# Patient Record
Sex: MALE | Race: WHITE | NOT HISPANIC OR LATINO | Employment: FULL TIME | URBAN - NONMETROPOLITAN AREA
[De-identification: names, ages, dates, MRNs, and addresses within clinical notes are randomized per-mention and may not be internally consistent; named-entity substitution may affect disease eponyms.]

---

## 2024-08-16 ENCOUNTER — OFFICE VISIT (OUTPATIENT)
Dept: URGENT CARE | Facility: PHYSICIAN GROUP | Age: 39
End: 2024-08-16
Payer: COMMERCIAL

## 2024-08-16 VITALS
BODY MASS INDEX: 29.88 KG/M2 | TEMPERATURE: 98.1 F | RESPIRATION RATE: 16 BRPM | HEART RATE: 75 BPM | HEIGHT: 74 IN | OXYGEN SATURATION: 96 % | WEIGHT: 232.8 LBS | SYSTOLIC BLOOD PRESSURE: 112 MMHG | DIASTOLIC BLOOD PRESSURE: 70 MMHG

## 2024-08-16 DIAGNOSIS — M54.12 CERVICAL RADICULOPATHY: ICD-10-CM

## 2024-08-16 DIAGNOSIS — M62.838 MUSCLE SPASM: ICD-10-CM

## 2024-08-16 DIAGNOSIS — M47.812 CERVICAL SPONDYLOSIS: ICD-10-CM

## 2024-08-16 PROCEDURE — 99204 OFFICE O/P NEW MOD 45 MIN: CPT | Performed by: STUDENT IN AN ORGANIZED HEALTH CARE EDUCATION/TRAINING PROGRAM

## 2024-08-16 RX ORDER — PREDNISONE 20 MG/1
TABLET ORAL
Qty: 30 TABLET | Refills: 0 | Status: SHIPPED | OUTPATIENT
Start: 2024-08-16 | End: 2024-08-31

## 2024-08-16 ASSESSMENT — PAIN SCALES - GENERAL: PAINLEVEL: 3=SLIGHT PAIN

## 2024-08-16 NOTE — PROGRESS NOTES
"Subjective     Efraín Becerril is a 38 y.o. male who presents with Back Pain (Ongoing cervical pain since december)            Efraín is a 38 y.o. male who presents wto urgent care with neck pain.  Patient reports ongoing neck pain since December.  Patient has not physical therapy for neck pain is also established with pain/spine.  Patient states over the last couple days the pain has worsened and he is getting some numbness/tingling into arm. Patient reports similar symptoms in the past. 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        Review of Systems   Constitutional:  Negative for chills and fever.   Respiratory:  Negative for cough, shortness of breath and wheezing.    Cardiovascular:  Negative for chest pain.   Musculoskeletal:  Positive for neck pain.   Neurological:  Negative for dizziness and headaches.   All other systems reviewed and are negative.             Objective     /70   Pulse 75   Temp 36.7 °C (98.1 °F) (Temporal)   Resp 16   Ht 1.88 m (6' 2\")   Wt 106 kg (232 lb 12.8 oz)   SpO2 96%   BMI 29.89 kg/m²      Physical Exam  Vitals reviewed.   Constitutional:       Appearance: Normal appearance.   HENT:      Head: Normocephalic and atraumatic.      Nose: Nose normal.   Eyes:      Extraocular Movements: Extraocular movements intact.      Conjunctiva/sclera: Conjunctivae normal.      Pupils: Pupils are equal, round, and reactive to light.   Cardiovascular:      Rate and Rhythm: Normal rate.   Pulmonary:      Effort: Pulmonary effort is normal.   Musculoskeletal:      Cervical back: Spasms and tenderness present. No swelling or deformity. Decreased range of motion (limited ROM due to pain).      Thoracic back: Normal.      Lumbar back: Normal.      Comments: " Equal strength of bilateral upper extremities.   Skin:     General: Skin is warm and dry.   Neurological:      General: No focal deficit present.      Mental Status: He is alert and oriented to person, place, and time.                             Assessment & Plan        Assessment & Plan  Cervical radiculopathy  - Acute on chronic. Managed by [facility]. Patient advised to follow up with Pain/Spine as scheduled.  - Patient states prednisone has helped in the past. 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 Side effects/adverse reactions to oral prednisone discussed.    Orders:    predniSONE (DELTASONE) 20 MG Tab; Take 3 Tablets by mouth every day for 5 days, THEN 2 Tablets every day for 5 days, THEN 1 Tablet every day for 5 days.    Cervical spondylosis         Muscle spasm  - Patient requesting a prescription for tizanidine as Flexeril has not helped in the past.  - Discussed with patient that tizanidine can cause drowsiness/sedation. The patient was instructed to use medication mostly during the evening/night time. Instructed to avoid driving, operating machinery, or drinking alcohol while using this medication. Patient verbalized understanding and agreement.      Orders:    tizanidine (ZANAFLEX) 4 MG Tab; Take 1 Tablet by mouth every 6 hours as needed (pain and/or muscle spasm).         EXAMINATION:  MRI C SPINE WO IV CONT  (1/21/24)  REASON FOR EXAM:  Neck pain, chronic, degenerative changes on xray lifting injury   FINDINGS: Alignment unremarkable. Bone marrow unremarkable. Cord signal   unremarkable.   C2-3: Normal.   C3-4: Shallow disc bulging, no herniation or stenosis.   C4-5: Shallow disc bulging centrally. Disc and bone complexes extending to the   lateral recesses, without significant lateral recess narrowing. Mild to moderate  foraminal narrowing bilaterally.   C5-6: Left paracentral shallow disc protrusion, no cord contact or stenosis. Moderate left greater than right neural foraminal stenoses with facet and  uncovertebral changes.   C6-7: Shallow central disc protrusion, no cord contact or stenosis. Moderate to more advanced foraminal narrowing bilaterally with disc and bone complexes and   facet changes.   C7-T1: Normal.     Other: At T1-2, right paracentral and lateral recess disc protrusion is present, with mild lateral recess narrowing.     Differential diagnoses, supportive care measures  and indications for immediate follow-up discussed with patient. Pathogenesis of diagnosis discussed including typical length and natural progression.      Instructed to return to urgent care or nearest emergency department if symptoms fail to improve, for any change in condition, further concerns, or new concerning symptoms.    Patient states understanding and agrees with the plan of care and discharge instructions.

## 2024-08-22 ASSESSMENT — ENCOUNTER SYMPTOMS
WHEEZING: 0
COUGH: 0
DIZZINESS: 0
FEVER: 0
CHILLS: 0
SHORTNESS OF BREATH: 0
HEADACHES: 0
NECK PAIN: 1